# Patient Record
Sex: FEMALE | Race: WHITE | NOT HISPANIC OR LATINO | Employment: OTHER | URBAN - METROPOLITAN AREA
[De-identification: names, ages, dates, MRNs, and addresses within clinical notes are randomized per-mention and may not be internally consistent; named-entity substitution may affect disease eponyms.]

---

## 2023-11-21 NOTE — PROGRESS NOTES
No chief complaint on file.    HPI  Janeth Celestin is a 53 year old female referred to me today for further evaluation and treatment of chronic parotiditis  She had a CT of neck done 8/2023 at [facility] that showed: The parotid glands are prominent bilaterally but no definitive parotid mass is appreciated.  No mass or adenopathy is seen involving the cervical soft tissues. Images have been requested from [facility].    Tob:  ETOH:    PMH  No past medical history on file.     PSH  No past surgical history on file.     ROS  Review of Systems     PE  ENT Physical Exam     Procedures     ASSESSMENT AND PLAN  Problem List Items Addressed This Visit    None           By signing my name below, I, Gladis Pastor, attest that this documentation has been prepared under the direction and in the presence of Dr. Liliana Guerrero MD.     All medical record entries made by the Scribe were at my direction and personally dictated by me, Dr. Liliana Guerrero. I have reviewed the chart and agree that the record accurately reflects my personal performance of the history, physical exam, discussion and plan.

## 2023-11-27 ENCOUNTER — APPOINTMENT (OUTPATIENT)
Dept: OTOLARYNGOLOGY | Facility: CLINIC | Age: 53
End: 2023-11-27
Payer: COMMERCIAL

## 2024-08-21 ENCOUNTER — APPOINTMENT (OUTPATIENT)
Dept: OTOLARYNGOLOGY | Facility: CLINIC | Age: 54
End: 2024-08-21
Payer: MEDICARE

## 2024-10-14 ENCOUNTER — APPOINTMENT (OUTPATIENT)
Dept: OTOLARYNGOLOGY | Facility: CLINIC | Age: 54
End: 2024-10-14
Payer: MEDICARE

## 2024-11-25 ENCOUNTER — APPOINTMENT (OUTPATIENT)
Dept: OTOLARYNGOLOGY | Facility: CLINIC | Age: 54
End: 2024-11-25
Payer: MEDICARE

## 2024-11-25 VITALS — BODY MASS INDEX: 57.52 KG/M2 | HEIGHT: 60 IN | WEIGHT: 293 LBS

## 2024-11-25 DIAGNOSIS — H69.93 ETD (EUSTACHIAN TUBE DYSFUNCTION), BILATERAL: ICD-10-CM

## 2024-11-25 DIAGNOSIS — K11.20 SIALOADENITIS: Primary | ICD-10-CM

## 2024-11-25 PROCEDURE — 1036F TOBACCO NON-USER: CPT | Performed by: OTOLARYNGOLOGY

## 2024-11-25 PROCEDURE — 3008F BODY MASS INDEX DOCD: CPT | Performed by: OTOLARYNGOLOGY

## 2024-11-25 PROCEDURE — 99204 OFFICE O/P NEW MOD 45 MIN: CPT | Performed by: OTOLARYNGOLOGY

## 2024-11-25 ASSESSMENT — ENCOUNTER SYMPTOMS
COUGH: 0
SORE THROAT: 0
CHILLS: 0
ADENOPATHY: 0
FATIGUE: 0
TROUBLE SWALLOWING: 0
FACIAL SWELLING: 1
VOICE CHANGE: 0
UNEXPECTED WEIGHT CHANGE: 0
APPETITE CHANGE: 0
STRIDOR: 0
NECK PAIN: 0
SINUS COMPLAINT: 1
FEVER: 0
NAUSEA: 0
SHORTNESS OF BREATH: 0
VOMITING: 0

## 2024-11-26 NOTE — ASSESSMENT & PLAN NOTE
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    I recommend stopping the robinul   Continue conservative tx - hydration (as much as she can with heart failure - lasix, massage, etc)  I answered all of the patients questions.  They did appear to understand and confirmed this, and do agree to proceed as outlined above.

## 2024-11-26 NOTE — PROGRESS NOTES
Subjective   Patient ID: Janeth Celestin is a 54 y.o. female who presents for Sinus Problem.  She has a history of chronic respiratory failure after COVID PNA s/p tracheotomy 2021.  She remains vent dependent at night.  She also has history of FAISAL (now s/p trach), COPD, DM2, necrotizing fasciitis LLE, morbid obesity, Crohn's dx, GERD and heart failure (EF65%).  She is here today due to concern for chronic sialadenitis of her parotid & submandibular glands.  It affects her parotid glands worse than the submandibular glands.  Right side is affected worse than the left.  She has had chronic parotid gland enlargement for >1 year.  She has been treated by Dr. French.  She is currently on Robinol (on med list at her facility although she was not aware).  She reports +pain R>L parotid glands.  She does have some mild discomfort with the submandibular glands but not nearly as much as the parotid glands.  She is here for another opinion.  Her brother had similar symptoms with his saliva glands and then was diagnosed with cancer and she is concerned.  Eating/drinking.  Lives in facility.  Feels hearing is under water but no had hearing tested and no fluid in ears.  Ears will pop and hearing will improve.      Sinus Problem  Pertinent negatives include no chills, coughing, ear pain, neck pain, shortness of breath or sore throat.       Review of Systems   Constitutional:  Negative for appetite change, chills, fatigue, fever and unexpected weight change.   HENT:  Positive for facial swelling. Negative for dental problem, drooling, ear pain, hearing loss, mouth sores, sore throat, tinnitus, trouble swallowing and voice change.    Respiratory:  Negative for cough, shortness of breath and stridor.    Gastrointestinal:  Negative for nausea and vomiting.   Musculoskeletal:  Negative for neck pain.   Hematological:  Negative for adenopathy.       Objective   Physical Exam  Constitutional:       Appearance: She is obese.      Comments:  +on a stretcher, transported by facility, she is able to speak clearly with PMV via her trach   HENT:      Head: Normocephalic and atraumatic.      Comments: Parotid glands are edematous R>>L, tender to palpation R>L, there is mild tenderness of the submandibular glands but not as much as the parotid.  No nodules within the parotid or submandibular glands.  Difficult to get salivary flow.     Right Ear: Tympanic membrane, ear canal and external ear normal.      Left Ear: Tympanic membrane, ear canal and external ear normal.      Ears:      Comments: Specifically no DEMETRICE bilaterally     Nose: Nose normal.      Mouth/Throat:      Mouth: Mucous membranes are moist.      Pharynx: Oropharynx is clear.   Eyes:      Conjunctiva/sclera: Conjunctivae normal.   Neck:      Comments: #6 cuffed XLT shiley trach in place.  Salivary glands as above  Lymphadenopathy:      Cervical: No cervical adenopathy.   Skin:     General: Skin is warm and dry.   Neurological:      Mental Status: She is alert and oriented to person, place, and time.   Psychiatric:         Mood and Affect: Mood normal.         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    I recommend stopping the robinul   Continue conservative tx - hydration (as much as she can with heart failure - lasix, massage, etc)  I answered all of the patients questions.  They did appear to understand and confirmed this, and do agree to proceed as outlined above.          ETD (Eustachian tube dysfunction), bilateral H69.93     I don't have access to her hearing test but no evidence of DEMETRICE on exam today  History/exam consistent with  DEMETRICE  Continue to monitor             Liliana Guerrero MD    Head & Neck Surgical Oncology & Reconstruction  Department of Otolaryngology - Head and Neck Surgery

## 2024-11-26 NOTE — ASSESSMENT & PLAN NOTE
I don't have access to her hearing test but no evidence of DEMETRICE on exam today  History/exam consistent with DEMETRICE  Continue to monitor

## 2025-03-19 ENCOUNTER — APPOINTMENT (OUTPATIENT)
Dept: OTOLARYNGOLOGY | Facility: CLINIC | Age: 55
End: 2025-03-19
Payer: MEDICARE